# Patient Record
Sex: MALE | URBAN - METROPOLITAN AREA
[De-identification: names, ages, dates, MRNs, and addresses within clinical notes are randomized per-mention and may not be internally consistent; named-entity substitution may affect disease eponyms.]

---

## 2024-03-07 DIAGNOSIS — Z12.11 SCREENING FOR COLON CANCER: Primary | ICD-10-CM

## 2024-03-27 ENCOUNTER — TELEPHONE (OUTPATIENT)
Dept: GASTROENTEROLOGY | Facility: CLINIC | Age: 64
End: 2024-03-27
Payer: MEDICARE

## 2024-03-27 NOTE — TELEPHONE ENCOUNTER
----- Message from Elisa Dang sent at 3/27/2024  9:49 AM CDT -----  Regarding: Cancellation  Contact: Patient  Per phone call with patient, he would like to cancel the appointment on 04/08/2024.  Please return call at [phone].    Thanks,  SJ

## 2024-06-28 ENCOUNTER — TELEPHONE (OUTPATIENT)
Dept: GASTROENTEROLOGY | Facility: CLINIC | Age: 64
End: 2024-06-28
Payer: MEDICARE

## 2024-06-28 NOTE — TELEPHONE ENCOUNTER
Patients wife called. Patients wife wanted to schedule her and his colonoscopy. Patients are both est [facility] patients. Patients are both due this year. Patient did advise they did go to schedule their colonoscopy with another physician in [facility]. That physician makes you do chest x rays and blood work before your colonoscopies. Patient did not wish to complete those due it going to be out of pocket expenses for them. Please advise to schedule patient with ov due to medicare or not. 6/28/24 Denise Paez [facility] Clinical Support Staff  Caller: Ralf/Spouse (Today, 10:16 AM)  Type:  Needs Medical Advice    Who Called: Rosalva  Symptoms (please be specific): Colonoscopy inquiry  How long has patient had these symptoms:  Unknown  Pharmacy name and phone #:  No Pharmacies Listed  Would the patient rather a call back or a response via [facility]? call  Best Call Back Number: [phone] or [phone]  Additional Information: Patient's spouse request to ask questions regarding colonoscopy prior to scheduling. Please give patient a call back to assist.  Thank you,  GH

## 2024-07-01 ENCOUNTER — TELEPHONE (OUTPATIENT)
Dept: GASTROENTEROLOGY | Facility: CLINIC | Age: 64
End: 2024-07-01
Payer: MEDICARE

## 2024-07-01 NOTE — TELEPHONE ENCOUNTER
----- Message from Latasha Stewart MA sent at 7/1/2024 10:42 AM CDT -----  Contact: Rosalva GUERRERO.  ----- Message -----  From: Rosina Dumont MA  Sent: 7/1/2024  10:21 AM CDT  To: [facility] Procedure Scheduling      ----- Message -----  From: Trisha Andrade  Sent: 7/1/2024  10:16 AM CDT  To: Braden LARSON Staff; #    Type: Caller is Requesting to Schedule Colonoscopy     Who Called: Ralf Phillip  Best Call Back Number: [phone]  Patient's Provider: Netta  Date of Last Colonoscopy: 2019  Additional Information: n/a

## 2024-07-01 NOTE — TELEPHONE ENCOUNTER
Returned pt call. I told him that he would need an OV with one of our NP's for screen colon. Schedule OV with [facility] for 7/22/24 as well as pt's spouse (Rosalva Phillip). Pt want to call the hospital to make sure that they accept Medicare and C. PT will call back to cancel if they do not accept. greg

## 2024-07-19 RX ORDER — FUROSEMIDE 40 MG/1
TABLET ORAL
COMMUNITY
Start: 2024-05-07

## 2024-07-19 RX ORDER — SIMVASTATIN 20 MG/1
TABLET, FILM COATED ORAL
COMMUNITY
Start: 2024-05-07

## 2024-07-19 RX ORDER — LOSARTAN POTASSIUM 100 MG/1
TABLET ORAL
COMMUNITY
Start: 2024-05-07

## 2024-07-19 NOTE — PROGRESS NOTES
Clinic Note    Reason for visit:  The primary encounter diagnosis was Diverticulosis. Diagnoses of History of colon polyps and Screening for malignant neoplasm of colon were also pertinent to this visit.    PCP: Shadi Calvo       HPI:  This is a 63 y.o. male who was previously established with Dr. Don. Patient denies any reflux, dysphagia, abdominal pain, constipation, diarrhea, or blood in stool.     Colonoscopy 4/2019 TA x 2 - repeat in 5 years.      Review of Systems   Constitutional:  Negative for diaphoresis, fatigue, fever and unexpected weight change.   HENT:  Negative for hearing loss, mouth sores, postnasal drip, sore throat and trouble swallowing.    Eyes:  Negative for pain, discharge and eye dryness.   Respiratory:  Negative for apnea, cough, choking, chest tightness, shortness of breath and wheezing.    Cardiovascular:  Negative for chest pain, palpitations and leg swelling.   Gastrointestinal:  Negative for abdominal distention, abdominal pain, anal bleeding, blood in stool, change in bowel habit, constipation, diarrhea, nausea, rectal pain, vomiting, reflux and fecal incontinence.   Genitourinary:  Negative for bladder incontinence, dysuria and hematuria.   Musculoskeletal:  Positive for back pain. Negative for arthralgias and joint swelling.   Integumentary:  Negative for color change and rash.   Allergic/Immunologic: Negative for environmental allergies and food allergies.   Neurological:  Negative for seizures and headaches.   Hematological:  Negative for adenopathy. Does not bruise/bleed easily.        Past Medical History:   Diagnosis Date    Arthritis     DJD (degenerative joint disease)     Essential (primary) hypertension     Follicular lymphoma     2015    High cholesterol     Neuropathy     Obstructive sleep apnea     Setting-13     Past Surgical History:   Procedure Laterality Date    Follicular lymphoma      2015- to have it removed    KNEE ARTHROSCOPY Left     2005    KNEE  SURGERY Right     2003-ACL    NECK SURGERY      removed lympnodes-2015    ROBOT-ASSISTED REPAIR OF BILATERAL INGUINAL HERNIAS      2001    UMBILICAL HERNIA REPAIR      2008     Family History   Problem Relation Name Age of Onset    Breast cancer Mother      Brain cancer Mother      Heart disease Father      Heart attack Sister      Colon cancer Neg Hx      Crohn's disease Neg Hx      Esophageal cancer Neg Hx      Liver cancer Neg Hx      Rectal cancer Neg Hx      Stomach cancer Neg Hx      Ulcerative colitis Neg Hx      Liver disease Neg Hx       Social History     Tobacco Use    Smoking status: Never    Smokeless tobacco: Never   Substance Use Topics    Alcohol use: Yes    Drug use: Never     Review of patient's allergies indicates:   Allergen Reactions    Sulfa (sulfonamide antibiotics) Other (See Comments)     Give patient real bad chills      Medication List with Changes/Refills   New Medications    SODIUM,POTASSIUM,MAG SULFATES (SUPREP BOWEL PREP KIT) 17.5-3.13-1.6 GRAM SOLR    Take according to kit instructions but DO NOT eat breakfast the morning of procedure.   Current Medications    FUROSEMIDE (LASIX) 40 MG TABLET        LOSARTAN (COZAAR) 100 MG TABLET        NAPROXEN (NAPROSYN) 500 MG TABLET    Take 500 mg by mouth.    SIMVASTATIN (ZOCOR) 20 MG TABLET             Vital Signs:  /75 (BP Location: Left arm, Patient Position: Sitting)   Pulse (!) 57   Ht 6' (1.829 m)   Wt (!) 140.7 kg (310 lb 3.2 oz)   SpO2 95%   BMI 42.07 kg/m²        Physical Exam  Constitutional:       General: He is not in acute distress.     Appearance: Normal appearance. He is well-developed. He is obese. He is not ill-appearing or toxic-appearing.   HENT:      Head: Normocephalic and atraumatic.      Nose: Nose normal.      Mouth/Throat:      Mouth: Mucous membranes are moist.      Pharynx: Oropharynx is clear. No oropharyngeal exudate or posterior oropharyngeal erythema.   Eyes:      General: Lids are normal. No scleral  icterus.        Right eye: No discharge.         Left eye: No discharge.      Conjunctiva/sclera: Conjunctivae normal.   Cardiovascular:      Rate and Rhythm: Normal rate and regular rhythm.      Pulses:           Radial pulses are 2+ on the right side and 2+ on the left side.   Pulmonary:      Effort: Pulmonary effort is normal. No respiratory distress.      Breath sounds: No stridor. No wheezing.   Abdominal:      General: Bowel sounds are normal. There is no distension.      Palpations: Abdomen is soft. There is no fluid wave, hepatomegaly, splenomegaly or mass.      Tenderness: There is no abdominal tenderness. There is no guarding or rebound.   Musculoskeletal:      Cervical back: Full passive range of motion without pain.   Lymphadenopathy:      Cervical: No cervical adenopathy.   Skin:     General: Skin is warm and dry.      Capillary Refill: Capillary refill takes less than 2 seconds.      Coloration: Skin is not cyanotic, jaundiced or pale.   Neurological:      General: No focal deficit present.      Mental Status: He is alert and oriented to person, place, and time.   Psychiatric:         Mood and Affect: Mood normal.         Behavior: Behavior is cooperative.            All of the data above and below has been reviewed by myself and any further interpretations will be reflected in the assessment and plan.   The data includes review of external notes, and independent interpretation of lab results, procedures, x-rays, and imaging reports.      Assessment:  Diverticulosis    History of colon polyps  -     Ambulatory Referral to External Surgery  -     sodium,potassium,mag sulfates (SUPREP BOWEL PREP KIT) 17.5-3.13-1.6 gram SolR; Take according to kit instructions but DO NOT eat breakfast the morning of procedure.  Dispense: 1 kit; Refill: 0    Screening for malignant neoplasm of colon  -     Ambulatory Referral to External Surgery      Hx colon polyps- due for colonoscopy    Recommendations:    Schedule  colonoscopy with Dr. Feliciano.      Risks, benefits, and alternatives of medical management, any associated procedures, and/or treatment discussed with the patient. Patient given opportunity to ask questions and voices understanding. Patient has elected to proceed with the recommended care modalities as discussed.    Follow up if symptoms worsen or fail to improve.    Order summary:  Orders Placed This Encounter   Procedures    Ambulatory Referral to External Surgery        Instructed patient to notify my office if they have not been contacted within two weeks after any procedures, submitting any samples (biopsies, blood, stool, urine, etc.) or after any imaging (X-ray, CT, MRI, etc.).      Varsha Oliveros NP    This document may have been created using a voice recognition transcribing system. Incorrect words or phrases may have been missed during proofreading. Please interpret accordingly or contact me for clarification.

## 2024-07-22 ENCOUNTER — TELEPHONE (OUTPATIENT)
Dept: GASTROENTEROLOGY | Facility: CLINIC | Age: 64
End: 2024-07-22

## 2024-07-22 ENCOUNTER — OFFICE VISIT (OUTPATIENT)
Dept: GASTROENTEROLOGY | Facility: CLINIC | Age: 64
End: 2024-07-22
Payer: MEDICARE

## 2024-07-22 VITALS
HEART RATE: 57 BPM | DIASTOLIC BLOOD PRESSURE: 75 MMHG | WEIGHT: 310.19 LBS | HEIGHT: 72 IN | OXYGEN SATURATION: 95 % | BODY MASS INDEX: 42.01 KG/M2 | SYSTOLIC BLOOD PRESSURE: 128 MMHG

## 2024-07-22 DIAGNOSIS — Z12.11 SCREENING FOR MALIGNANT NEOPLASM OF COLON: ICD-10-CM

## 2024-07-22 DIAGNOSIS — K57.90 DIVERTICULOSIS: Primary | ICD-10-CM

## 2024-07-22 DIAGNOSIS — Z86.010 HISTORY OF COLON POLYPS: ICD-10-CM

## 2024-07-22 PROCEDURE — 99202 OFFICE O/P NEW SF 15 MIN: CPT | Mod: S$GLB,,, | Performed by: NURSE PRACTITIONER

## 2024-07-22 RX ORDER — NAPROXEN 500 MG/1
500 TABLET ORAL
COMMUNITY

## 2024-07-22 RX ORDER — SODIUM, POTASSIUM,MAG SULFATES 17.5-3.13G
SOLUTION, RECONSTITUTED, ORAL ORAL
Qty: 1 KIT | Refills: 0 | Status: SHIPPED | OUTPATIENT
Start: 2024-07-22

## 2024-07-22 NOTE — PATIENT INSTRUCTIONS
Schedule colonoscopy with Dr. Feliciano.    Please notify my office if you have not been contacted within two weeks after any procedures, submitting any samples (biopsies, blood, stool, urine, etc.) or after any imaging (X-ray, CT, MRI, etc.).

## 2024-07-22 NOTE — TELEPHONE ENCOUNTER
Patient was given instructions and they were reviewed with patient. Patient was given AVS with apt details. Patients order was faxed to central scheduling at [facility]. No pa required. LRA 7/22/24

## 2024-12-17 ENCOUNTER — TELEPHONE (OUTPATIENT)
Dept: GASTROENTEROLOGY | Facility: CLINIC | Age: 64
End: 2024-12-17
Payer: MEDICARE

## 2024-12-17 VITALS — BODY MASS INDEX: 41.99 KG/M2 | WEIGHT: 310 LBS | HEIGHT: 72 IN

## 2024-12-17 DIAGNOSIS — Z12.11 SCREENING FOR COLON CANCER: ICD-10-CM

## 2024-12-17 DIAGNOSIS — Z86.0100 HISTORY OF COLON POLYPS: Primary | ICD-10-CM

## 2024-12-17 NOTE — TELEPHONE ENCOUNTER
"[facility]  [address] [place] [address]  Phone: [phone]  Fax: [phone]    History & Physical         Provider: Dr. Iris Feliciano    Patient Name: Ralf LO (age):1960  64 y.o.           Gender: male   Phone: [phone]     Referring Physician: Shadi Calvo     Vital Signs:   Height - 6' 0"  Weight - 310 lb  BMI -  42.04    Plan: Colonoscopy @ [facility]    Encounter Diagnoses   Name Primary?    History of colon polyps Yes    Screening for colon cancer            History:      Past Medical History:   Diagnosis Date    Arthritis     DJD (degenerative joint disease)     Essential (primary) hypertension     Follicular lymphoma         High cholesterol     Neuropathy     Obstructive sleep apnea     Setting-13      Past Surgical History:   Procedure Laterality Date    Follicular lymphoma      - to have it removed    KNEE ARTHROSCOPY Left     2005    KNEE SURGERY Right     -ACL    NECK SURGERY      removed lympnodes-    ROBOT-ASSISTED REPAIR OF BILATERAL INGUINAL HERNIAS          UMBILICAL HERNIA REPAIR            Medication List with Changes/Refills   Current Medications    FUROSEMIDE (LASIX) 40 MG TABLET        LOSARTAN (COZAAR) 100 MG TABLET        NAPROXEN (NAPROSYN) 500 MG TABLET    Take 500 mg by mouth.    SIMVASTATIN (ZOCOR) 20 MG TABLET        SODIUM,POTASSIUM,MAG SULFATES (SUPREP BOWEL PREP KIT) 17.5-3.13-1.6 GRAM SOLR    Take according to kit instructions but DO NOT eat breakfast the morning of procedure.      Review of patient's allergies indicates:   Allergen Reactions    Sulfa (sulfonamide antibiotics) Other (See Comments)     Give patient real bad chills      Family History   Problem Relation Name Age of Onset    Breast cancer Mother      Brain cancer Mother      Heart disease Father      Heart attack Sister      Colon cancer Neg Hx      " Crohn's disease Neg Hx      Esophageal cancer Neg Hx      Liver cancer Neg Hx      Rectal cancer Neg Hx      Stomach cancer Neg Hx      Ulcerative colitis Neg Hx      Liver disease Neg Hx        Social History     Tobacco Use    Smoking status: Never    Smokeless tobacco: Never   Substance Use Topics    Alcohol use: Yes    Drug use: Never        Physical Examination:     General Appearance:___________________________  HEENT: _____________________________________  Abdomen:____________________________________  Heart:________________________________________  Lungs:_______________________________________  Extremities:___________________________________  Skin:_________________________________________  Endocrine:____________________________________  Genitourinary:_________________________________  Neurological:__________________________________      Patient has been evaluated immediately prior to sedation and is medically cleared for endoscopy with IVCS as an ASA class: ______      Physician Signature: _________________________       Date: ________  Time: ________

## 2024-12-17 NOTE — TELEPHONE ENCOUNTER
S/w pt and told him that I was calling as a courtesy regarding up coming Colon with NBP on 1/7/25, [place] and wanted to verify that he has his paper prep instructions and meds.  Pt stated he has both. I also mentioned that [facility] will call the day before (Mon) with the arrival time, GI Lab is located on the third floor, and to pre-register anytime the week  before the procedure. [facility]      Pt mentioned that his 2nday insurance has changed to [employer] -Medicare supplement G Id # [id number] - 11. Pt also wanted an itemized bill for his OV on 7/22/24. I provided the billing dept contact number. ridge

## 2024-12-18 NOTE — TELEPHONE ENCOUNTER
Trisha Andrade Staff  Caller: self (Yesterday,  3:30 PM)  Patient is requesting a call back regarding asking to speak with marya again. Please call back at [phone]      12/18/24 1:04 PM    Returned pt call. Pt wanted to know when his wife's colonoscopy was schedule. I provided him with the info and emailed prep instructions to [email or web address]. greg

## 2025-01-07 ENCOUNTER — OUTSIDE PLACE OF SERVICE (OUTPATIENT)
Dept: GASTROENTEROLOGY | Facility: CLINIC | Age: 65
End: 2025-01-07

## 2025-01-07 LAB — CRC RECOMMENDATION EXT: NORMAL

## 2025-01-23 ENCOUNTER — TELEPHONE (OUTPATIENT)
Dept: GASTROENTEROLOGY | Facility: CLINIC | Age: 65
End: 2025-01-23
Payer: MEDICARE

## 2025-01-23 NOTE — TELEPHONE ENCOUNTER
Cecal Bx inflamed TA, 7 TA. Reviewed with pathologist who confirmed TA with some inflammation on Jar A cecal biopsy.      Recommend avoiding NSAID x2-3 months then repeat colonoscopy for resection attempt by advanced endoscopist.  Will review with patient.  NBP

## 2025-01-30 NOTE — TELEPHONE ENCOUNTER
I reviewed the results and recommendations with the patient who asked that I speak to his wife.  I called his wife (Rosalva) at [phone].  I discussed with her as well.  Ultimately they decided to have a referral placed to [facility].  He has been a patient of [facility] for eight years and follows with them yearly for his history of lymphoma.  I am not sure if Gastroenterology or Colorectal surgery would manage the referral but will send to Gastroenterology for advanced endoscopy Services.  Patient reports he may take the Naprosyn about once a month.  Knowing this would not think that degree of NSAID use would cause that degree of inflammation.  He has at least two patches of abnormal appearing mucosa in the cecum and proximal ascending colon.  These will need to be evaluated for endoscopic mucosal resection.    Place referral online to [facility] for resection of proximal colon polyps.    Include these notes, colonoscopy report, pathology report, and last office visit note.  NBP

## 2025-01-31 NOTE — TELEPHONE ENCOUNTER
Returned call. Spoke with Sumit. Patient's appointment with Dr. Meyer ([facility]) is on 7/17/2025. DMP

## 2025-01-31 NOTE — TELEPHONE ENCOUNTER
[facility] referral sent. May let patient know that his [facility] referral was sent. They will review Dr. Olmos records and should call with an appointment.  BEN

## 2025-01-31 NOTE — TELEPHONE ENCOUNTER
The referral was for a new problem. It was for endoscopic resection of an adenomatous polyp. The referral was to go to the GI team for advanced endoscopy services. Will need to clarify with him.  BEN

## 2025-01-31 NOTE — TELEPHONE ENCOUNTER
Message  Received: Today   Hospital Follow Up  Maylin Black Staff  Caller: [place] (Today,  8:40 AM)  [place] from [facility] [place] is calling in regards a referral that they received:  The patient is already established and he has an appointment for a follow up.  If we need more information Please call her back at  option 1  Thanks!

## 2025-02-02 NOTE — TELEPHONE ENCOUNTER
Called and spoke with someone. (I forgot her name) She said since patient is already established, he didn't need to go through new patient services again so that's why the referral was cancelled. They are aware of endoscopic resection of an adenomatous polyp. She also said patient is scheduled with GI in July so she believes they are aware, they just cancelled the new patient referral. DMP

## 2025-02-10 ENCOUNTER — DOCUMENTATION ONLY (OUTPATIENT)
Dept: GASTROENTEROLOGY | Facility: CLINIC | Age: 65
End: 2025-02-10
Payer: MEDICARE

## 2025-02-10 NOTE — TELEPHONE ENCOUNTER
Called [facility]. Spoke with 4 different people. States referral was cancelled because patient is seeing GI in July. Discussed how this is a referral for new problem. They were unaware how to put in a referral for an existing patient. Asked if I could email Dr. Juan (gastro) who is seeing patient 7/17/2025 at [email or web address]    Email sent to Dr Juan. If no reply within 2 weeks will try to send referral again.  BEN

## 2025-02-11 ENCOUNTER — TELEPHONE (OUTPATIENT)
Dept: GASTROENTEROLOGY | Facility: CLINIC | Age: 65
End: 2025-02-11
Payer: MEDICARE

## 2025-02-11 NOTE — TELEPHONE ENCOUNTER
----- Message from Sana sent at 2/11/2025 11:39 AM CST -----  Contact: pt  Pt calling about stating he has not heard from md [facility] yet and he can be reached at [phone]     Thanks,

## 2025-02-11 NOTE — TELEPHONE ENCOUNTER
May let patient know that I received a response from Dr. Juan (Gastro at [facility]) and they will be calling to get him rescheduled to a sooner appointment. They requested color pictures of his colonoscopy which I emailed him.   Would also recommend him to come by office one day to  physical copy of images to bring with him for his appt.  KN

## 2025-02-11 NOTE — TELEPHONE ENCOUNTER
Spoke with patient and relayed the information on the referral notated by Nurse Maddox. Patient understands and will check back in 2 weeks if he doesn't hear back by then.

## 2025-03-05 ENCOUNTER — TELEPHONE (OUTPATIENT)
Dept: GASTROENTEROLOGY | Facility: CLINIC | Age: 65
End: 2025-03-05
Payer: MEDICARE

## 2025-03-06 NOTE — TELEPHONE ENCOUNTER
2/26/2025 [facility] colonoscopy: 4 TA cecum (one 10mm), ICV lipomatous Bx (lymph), 8 TA/SSA TC, 3 hyp sigmoid.  Rec repeat in one year. Will confirm Dr. Juan completes when that time is closer.   NBP